# Patient Record
Sex: FEMALE | URBAN - METROPOLITAN AREA
[De-identification: names, ages, dates, MRNs, and addresses within clinical notes are randomized per-mention and may not be internally consistent; named-entity substitution may affect disease eponyms.]

---

## 2017-10-05 ENCOUNTER — VIRTUAL VISIT (OUTPATIENT)
Dept: FAMILY MEDICINE | Facility: OTHER | Age: 37
End: 2017-10-05

## 2017-10-05 NOTE — PROGRESS NOTES
"Date:   Clinician: Krysta Moura  Clinician NPI: [id number]  Patient: Jill Velazquez  Patient : 1980  Patient Address: [address] [place] [address]  Patient Phone: [phone]  Visit Protocol: URI  Patient Summary:  Jill is a 36 year old ( : 1980 ) female who initiated a Visit for cold, sinus infection, or influenza. When asked the question \"Please sign me up to receive news, health information and promotions. \", Jill responded \"No\".    Jill states her symptoms started gradually 2-3 weeks ago.   Her symptoms consist of nasal congestion, malaise, myalgia, a sore throat, facial pain or pressure, a headache, and tooth pain.   Symptom Details     Nasal secretions: The color of her mucus is green.    Sore throat: Jill reports having moderate throat pain, does not have exudate on her tonsils, and is able to swallow liquids. The lymph nodes in her neck are not enlarged. She states that rashes have not appeared on the skin since the sore throat started.     Facial pain or pressure: The facial pain or pressure feels worse when bending over or leaning forward.     Headache: She states the headache is moderate.     Tooth pain: The tooth pain is not caused by a cavity, recent dental work, or other mouth problems.      Jlil denies having enlarged lymph nodes, wheezing, rhinitis, dyspnea, ear pain, cough, chills, and fever. She also denies taking antibiotic medication for the symptoms, having recent facial or sinus surgery in the past 60 days, and double sickening.   Within the past week, Jill has not been exposed to someone with strep throat. She has not recently been exposed to someone with influenza. Jill has been in close contact with the following high risk individuals: children under the age of 5.   Weight: 180 lbs   Jill does not smoke or use smokeless tobacco.   She denies pregnancy and denies breastfeeding. She does not menstruate.  MEDICATIONS:  Acetaminophen " (Tylenol), thyroid, diphenhydramine (Benadryl), phenylephrine (Sudafed PE), and ibuprofen (Advil, Motrin)  , ALLERGIES:   Levaquin (levofloxacin)/Ciprofloxacin (Cipro), sulfa (Bactrim/Septra), and penicillin/amoxicillin/augmentin    Clinician Response:  Deaniecy Melchor,  Based on the information you have provided, you likely have  acute bacterial sinusitis, otherwise known as a sinus infection.  I am prescribing Doxycycline Hyclate [Vibramycin] 100 mg. Take one tablet by mouth two times a day for 7 days. There are no refills with this prescription.   Sinus pressure occurs when the tissues lining your sinuses become swollen and inflamed. Afrin nasal spray decreases the swelling to provide the quickest and most effective relief from sinus pressure. Use oxymetazoline (Afrin, or store brand) nasal spray. Spray once in each nostril twice per day for a maximum of 3 days. Using this medication more frequently or longer than recommended may cause nasal congestion to reoccur or worsen. This is an over-the-counter medication you can find at most pharmacies.   I am also prescribing Flonase nasal spray. Flonase will also help reduce swelling in your sinuses, but it works differently than Afrin, so use both nasal sprays. Spray the Flonase twice (2 times) in each nostril every day at approximately the same time each day until you feel better. This medication takes several days to start working, so keep taking it if it doesn't help right away.   Unless your are allergic to the over-the-counter medication(s) below, I recommend using:   An antihistamine like Benadryl, Claritin or store brand. This is an over-the-counter medication that does not require a prescription.   A decongestant such as pseudoephedrine (Sudafed or store brand) to help your symptoms. This is an over-the-counter medication that does not require a prescription.   Acetaminophen (Tylenol), which helps to reduce your discomfort and fever. Take 1-2 pills every 4-6 hours.  This is an over-the-counter medication that does not require a prescription.   Ibuprofen. Take 1-3 tablets (200-600mg) every 8 hours to help with the discomfort. Make sure to take the ibuprofen with food. Do not exceed 2400mg in 24 hours. This is an over-the-counter medication that does not require a prescription.   Some people develop allergies to antibiotics. If you have significant swelling or difficulty breathing, stop the medication immediately and call 911 or go to an emergency room. If you notice a new rash, be seen at a clinic or urgent care.  Some women may also develop a yeast infection as a side effect of taking antibiotics. If you notice symptoms of a yeast infection, please use [facility] to get treatment.  If you become pregnant during this course of treatment, stop taking the medication and contact your primary care provider.  You will feel better faster if you take care of yourself by getting more rest and drinking plenty of liquids, especially water.  Remember to wash your hands often and stay home while you are sick to decrease the chance you will spread your infection to others.  Try the following to help with your throat pain and discomfort:     Use throat lozenges    Gargle with warm salt water (1/4 teaspoon of salt per 8 ounce glass of water)    Suck on frozen items such as popsicles or ice cubes     Call 911 or go to the emergency room if you feel that your throat is closing off, you suddenly develop a rash, you are unable to swallow fluids, you are drooling, or you are having difficulty breathing.   Diagnosis: Acute bacterial sinusitis  Diagnosis ICD: J01.90  Prescription: doxycycline Hyclate (Vibramycin) 100mg oral tablet 14 tablets, 7 days supply. Take one tablet by mouth two times a day for 7 days. Refills: 0, Refill as needed: no, Allow substitutions: yes  Prescription: fluticasone propionate (Flonase) 50mcg nasal spray 16 gm, 30 days supply. Take one or two inhalations in each nostril one  time a day. Refills: 0, Refill as needed: no, Allow substitutions: yes  Pharmacy: [id number] - [phone] - [address] [place] [address]